# Patient Record
Sex: FEMALE | Race: ASIAN | NOT HISPANIC OR LATINO | URBAN - METROPOLITAN AREA
[De-identification: names, ages, dates, MRNs, and addresses within clinical notes are randomized per-mention and may not be internally consistent; named-entity substitution may affect disease eponyms.]

---

## 2019-02-03 ENCOUNTER — EMERGENCY (EMERGENCY)
Facility: HOSPITAL | Age: 79
LOS: 1 days | Discharge: HOME | End: 2019-02-03
Attending: EMERGENCY MEDICINE | Admitting: EMERGENCY MEDICINE
Payer: MEDICARE

## 2019-02-03 VITALS
TEMPERATURE: 99 F | RESPIRATION RATE: 16 BRPM | HEART RATE: 98 BPM | SYSTOLIC BLOOD PRESSURE: 169 MMHG | OXYGEN SATURATION: 100 % | DIASTOLIC BLOOD PRESSURE: 83 MMHG

## 2019-02-03 VITALS
RESPIRATION RATE: 16 BRPM | OXYGEN SATURATION: 100 % | SYSTOLIC BLOOD PRESSURE: 177 MMHG | DIASTOLIC BLOOD PRESSURE: 62 MMHG | HEART RATE: 95 BPM

## 2019-02-03 LAB
ALBUMIN SERPL ELPH-MCNC: 4.7 G/DL — SIGNIFICANT CHANGE UP (ref 3.3–5)
ALP SERPL-CCNC: 77 U/L — SIGNIFICANT CHANGE UP (ref 40–120)
ALT FLD-CCNC: 10 U/L — SIGNIFICANT CHANGE UP (ref 4–33)
ANION GAP SERPL CALC-SCNC: 16 MMO/L — HIGH (ref 7–14)
APPEARANCE UR: CLEAR — SIGNIFICANT CHANGE UP
AST SERPL-CCNC: 23 U/L — SIGNIFICANT CHANGE UP (ref 4–32)
BASE EXCESS BLDV CALC-SCNC: 0.5 MMOL/L — SIGNIFICANT CHANGE UP
BASOPHILS # BLD AUTO: 0.03 K/UL — SIGNIFICANT CHANGE UP (ref 0–0.2)
BASOPHILS NFR BLD AUTO: 0.4 % — SIGNIFICANT CHANGE UP (ref 0–2)
BILIRUB SERPL-MCNC: 0.3 MG/DL — SIGNIFICANT CHANGE UP (ref 0.2–1.2)
BILIRUB UR-MCNC: NEGATIVE — SIGNIFICANT CHANGE UP
BLD GP AB SCN SERPL QL: NEGATIVE — SIGNIFICANT CHANGE UP
BLOOD GAS VENOUS - CREATININE: 0.8 MG/DL — SIGNIFICANT CHANGE UP (ref 0.5–1.3)
BLOOD UR QL VISUAL: HIGH
BUN SERPL-MCNC: 16 MG/DL — SIGNIFICANT CHANGE UP (ref 7–23)
CALCIUM SERPL-MCNC: 9.9 MG/DL — SIGNIFICANT CHANGE UP (ref 8.4–10.5)
CHLORIDE BLDV-SCNC: 105 MMOL/L — SIGNIFICANT CHANGE UP (ref 96–108)
CHLORIDE SERPL-SCNC: 100 MMOL/L — SIGNIFICANT CHANGE UP (ref 98–107)
CO2 SERPL-SCNC: 22 MMOL/L — SIGNIFICANT CHANGE UP (ref 22–31)
COLOR SPEC: YELLOW — SIGNIFICANT CHANGE UP
CREAT SERPL-MCNC: 0.89 MG/DL — SIGNIFICANT CHANGE UP (ref 0.5–1.3)
EOSINOPHIL # BLD AUTO: 0.02 K/UL — SIGNIFICANT CHANGE UP (ref 0–0.5)
EOSINOPHIL NFR BLD AUTO: 0.3 % — SIGNIFICANT CHANGE UP (ref 0–6)
EPI CELLS # UR: SIGNIFICANT CHANGE UP
GAS PNL BLDV: 135 MMOL/L — LOW (ref 136–146)
GLUCOSE BLDV-MCNC: 131 — HIGH (ref 70–99)
GLUCOSE SERPL-MCNC: 142 MG/DL — HIGH (ref 70–99)
GLUCOSE UR-MCNC: NEGATIVE — SIGNIFICANT CHANGE UP
HCO3 BLDV-SCNC: 23 MMOL/L — SIGNIFICANT CHANGE UP (ref 20–27)
HCT VFR BLD CALC: 39.8 % — SIGNIFICANT CHANGE UP (ref 34.5–45)
HCT VFR BLDV CALC: 38.5 % — SIGNIFICANT CHANGE UP (ref 34.5–45)
HGB BLD-MCNC: 12.2 G/DL — SIGNIFICANT CHANGE UP (ref 11.5–15.5)
HGB BLDV-MCNC: 12.5 G/DL — SIGNIFICANT CHANGE UP (ref 11.5–15.5)
IMM GRANULOCYTES NFR BLD AUTO: 0.3 % — SIGNIFICANT CHANGE UP (ref 0–1.5)
KETONES UR-MCNC: NEGATIVE — SIGNIFICANT CHANGE UP
LACTATE BLDV-MCNC: 2.2 MMOL/L — HIGH (ref 0.5–2)
LEUKOCYTE ESTERASE UR-ACNC: SIGNIFICANT CHANGE UP
LYMPHOCYTES # BLD AUTO: 2 K/UL — SIGNIFICANT CHANGE UP (ref 1–3.3)
LYMPHOCYTES # BLD AUTO: 25.3 % — SIGNIFICANT CHANGE UP (ref 13–44)
MCHC RBC-ENTMCNC: 27.9 PG — SIGNIFICANT CHANGE UP (ref 27–34)
MCHC RBC-ENTMCNC: 30.7 % — LOW (ref 32–36)
MCV RBC AUTO: 90.9 FL — SIGNIFICANT CHANGE UP (ref 80–100)
MONOCYTES # BLD AUTO: 0.3 K/UL — SIGNIFICANT CHANGE UP (ref 0–0.9)
MONOCYTES NFR BLD AUTO: 3.8 % — SIGNIFICANT CHANGE UP (ref 2–14)
NEUTROPHILS # BLD AUTO: 5.53 K/UL — SIGNIFICANT CHANGE UP (ref 1.8–7.4)
NEUTROPHILS NFR BLD AUTO: 69.9 % — SIGNIFICANT CHANGE UP (ref 43–77)
NITRITE UR-MCNC: NEGATIVE — SIGNIFICANT CHANGE UP
NRBC # FLD: 0 K/UL — LOW (ref 25–125)
PCO2 BLDV: 54 MMHG — HIGH (ref 41–51)
PH BLDV: 7.31 PH — LOW (ref 7.32–7.43)
PH UR: 6.5 — SIGNIFICANT CHANGE UP (ref 5–8)
PLATELET # BLD AUTO: 384 K/UL — SIGNIFICANT CHANGE UP (ref 150–400)
PMV BLD: 9.9 FL — SIGNIFICANT CHANGE UP (ref 7–13)
PO2 BLDV: 28 MMHG — LOW (ref 35–40)
POTASSIUM BLDV-SCNC: 3.6 MMOL/L — SIGNIFICANT CHANGE UP (ref 3.4–4.5)
POTASSIUM SERPL-MCNC: 4.1 MMOL/L — SIGNIFICANT CHANGE UP (ref 3.5–5.3)
POTASSIUM SERPL-SCNC: 4.1 MMOL/L — SIGNIFICANT CHANGE UP (ref 3.5–5.3)
PROT SERPL-MCNC: 8.3 G/DL — SIGNIFICANT CHANGE UP (ref 6–8.3)
PROT UR-MCNC: 70 — SIGNIFICANT CHANGE UP
RBC # BLD: 4.38 M/UL — SIGNIFICANT CHANGE UP (ref 3.8–5.2)
RBC # FLD: 13.9 % — SIGNIFICANT CHANGE UP (ref 10.3–14.5)
RBC CASTS # UR COMP ASSIST: SIGNIFICANT CHANGE UP (ref 0–?)
RH IG SCN BLD-IMP: NEGATIVE — SIGNIFICANT CHANGE UP
SAO2 % BLDV: 40.8 % — LOW (ref 60–85)
SODIUM SERPL-SCNC: 138 MMOL/L — SIGNIFICANT CHANGE UP (ref 135–145)
SP GR SPEC: 1.01 — SIGNIFICANT CHANGE UP (ref 1–1.04)
UROBILINOGEN FLD QL: NORMAL — SIGNIFICANT CHANGE UP
WBC # BLD: 7.9 K/UL — SIGNIFICANT CHANGE UP (ref 3.8–10.5)
WBC # FLD AUTO: 7.9 K/UL — SIGNIFICANT CHANGE UP (ref 3.8–10.5)
WBC UR QL: SIGNIFICANT CHANGE UP (ref 0–?)

## 2019-02-03 PROCEDURE — 99284 EMERGENCY DEPT VISIT MOD MDM: CPT

## 2019-02-03 NOTE — ED PROVIDER NOTE - ATTENDING CONTRIBUTION TO CARE
Attending note:   After face to face evaluation of this patient, I concur with above noted hx, pe, and care plan for this patient.  79 y/o F with recent uterine bx b/o uterine thickening on ultrasound c/o chronic pelvic pain since pessary removed.   Abd benign.   Evaluation in progress

## 2019-02-03 NOTE — ED ADULT NURSE NOTE - OBJECTIVE STATEMENT
Pt rec'd to ED R#8 Aox4, in NAD, c/o intermittent lower abd pain "burning" x2 months. Was being worked up in [facility] but is unhappy with the level of care. Had uterine biopsy performed on 01/31 and has not yet heard the results. Pt denies abd pain at this time, also denies N/V/D/ dysuria/ CP/ SOB/ other acute complaints. Pt is ambulatory to room. Son at bedside. VSS.

## 2019-02-03 NOTE — ED ADULT TRIAGE NOTE - CHIEF COMPLAINT QUOTE
c/o lower abdominal pain x 2 months and now having vaginal bleeding since this A.M., patient is s/p biopsy of uterus 01/31/19 at [facility].

## 2019-02-03 NOTE — ED PROVIDER NOTE - OBJECTIVE STATEMENT
78 year old female with pmhx of DM presenting with vaginal spotting for 1 day. Patient states today when she was urinating she noticed blood when wiping. Patient had previous spotting in December, which she was using a pessary for. Pessary was taken out 6 weeks ago, and since then has had intermittent lower abdominal and vaginal pain. Came to ED because was not able to get into gynecologist or PMDs office for pain or bleeding. Has taken tylenol for pain which has been unrelieved.    Had US showing thickened uterus, which bx was taken which is pending by Gyn for results.    Denies fevers, CP SOB N/V

## 2019-02-03 NOTE — ED PROVIDER NOTE - PROGRESS NOTE DETAILS
[facility] Note: pt p/w dysuria persistent since december and vag bleeding (2 pads per day, no clots, mild), respiratory acidosis [facility] Note: pt p/w dysuria persistent since december and vag bleeding (2 pads per day, no clots, mild), has been worked up by obgyn for this and had us and biopsy (results pending)., no recent change in symptoms. Pt states symptoms have improved since arrival. UA neg. Labs significant only for mild resp acidosis. Pt denies respiratory problems, hx copd or asthma. Endorses nocturnal snoring. Encouraged to d/w pmd.

## 2019-02-03 NOTE — ED PROVIDER NOTE - NSFOLLOWUPINSTRUCTIONS_ED_ALL_ED_FT
Follow up with your gynecologist and primary doctor.  Discuss biopsy results with your gynecologist and potential for pessary replacement.  Discuss your respiratory acidosis with your primary doctor and potential for pulmonology followup.  Return if you develop shortness of breath, chest pain, worsened vaginal bleeding, or abdominal pain.

## 2019-02-03 NOTE — ED PROVIDER NOTE - MEDICAL DECISION MAKING DETAILS
78 year old female with DM presenting with 1 day of vaginal spotting. Likely 2/2 postmenopausal bleeding, which is being evaluated by gyn outpatient. Will get cbc cmp trops type/screen and urine studies to evaluate. If neg, can f/u with PMD and gyn.

## 2019-02-03 NOTE — ED PROVIDER NOTE - CARE PLAN
Principal Discharge DX:	Vaginal spotting Principal Discharge DX:	Vaginal spotting  Assessment and plan of treatment:	The patient was given verbal and written discharge instructions. Specifically, instructions when to return to the ED and when to seek follow-up from their pcp was discussed. Any specialty follow-up was discussed, including how to make an appointment.  Instructions were discussed in simple, plain language and was understood by the patient. The patient understands that should their symptoms worsen or any new symptoms arise, they should return to the ED immediately for further evaluation.

## 2019-02-03 NOTE — ED ADULT NURSE NOTE - CHIEF COMPLAINT QUOTE
c/o lower abdominal pain x 2 months and now having vaginal bleeding since this A.M., patient is s/p biopsy of uterus 01/31/19 at [facility].

## 2023-07-25 ENCOUNTER — INPATIENT (INPATIENT)
Facility: HOSPITAL | Age: 83
LOS: 2 days | Discharge: HOME | End: 2023-07-28
Attending: STUDENT IN AN ORGANIZED HEALTH CARE EDUCATION/TRAINING PROGRAM | Admitting: STUDENT IN AN ORGANIZED HEALTH CARE EDUCATION/TRAINING PROGRAM
Payer: MEDICARE

## 2023-07-25 VITALS
RESPIRATION RATE: 18 BRPM | DIASTOLIC BLOOD PRESSURE: 80 MMHG | TEMPERATURE: 99 F | HEART RATE: 95 BPM | OXYGEN SATURATION: 100 % | SYSTOLIC BLOOD PRESSURE: 153 MMHG

## 2023-07-25 LAB
ALBUMIN SERPL ELPH-MCNC: 3.9 G/DL — SIGNIFICANT CHANGE UP (ref 3.3–5)
ALP SERPL-CCNC: 123 U/L — HIGH (ref 40–120)
ALT FLD-CCNC: 28 U/L — SIGNIFICANT CHANGE UP (ref 4–33)
ANION GAP SERPL CALC-SCNC: 11 MMOL/L — SIGNIFICANT CHANGE UP (ref 7–14)
AST SERPL-CCNC: 31 U/L — SIGNIFICANT CHANGE UP (ref 4–32)
BASOPHILS # BLD AUTO: 0.04 K/UL — SIGNIFICANT CHANGE UP (ref 0–0.2)
BASOPHILS NFR BLD AUTO: 0.4 % — SIGNIFICANT CHANGE UP (ref 0–2)
BILIRUB SERPL-MCNC: <0.2 MG/DL — SIGNIFICANT CHANGE UP (ref 0.2–1.2)
BUN SERPL-MCNC: 10 MG/DL — SIGNIFICANT CHANGE UP (ref 7–23)
CALCIUM SERPL-MCNC: 9.4 MG/DL — SIGNIFICANT CHANGE UP (ref 8.4–10.5)
CHLORIDE SERPL-SCNC: 102 MMOL/L — SIGNIFICANT CHANGE UP (ref 98–107)
CO2 SERPL-SCNC: 21 MMOL/L — LOW (ref 22–31)
CREAT SERPL-MCNC: 0.88 MG/DL — SIGNIFICANT CHANGE UP (ref 0.5–1.3)
EGFR: 64 ML/MIN/1.73M2 — SIGNIFICANT CHANGE UP
EOSINOPHIL # BLD AUTO: 0.25 K/UL — SIGNIFICANT CHANGE UP (ref 0–0.5)
EOSINOPHIL NFR BLD AUTO: 2.7 % — SIGNIFICANT CHANGE UP (ref 0–6)
GLUCOSE SERPL-MCNC: 124 MG/DL — HIGH (ref 70–99)
HCT VFR BLD CALC: 35.3 % — SIGNIFICANT CHANGE UP (ref 34.5–45)
HGB BLD-MCNC: 11.1 G/DL — LOW (ref 11.5–15.5)
IANC: 5.92 K/UL — SIGNIFICANT CHANGE UP (ref 1.8–7.4)
IMM GRANULOCYTES NFR BLD AUTO: 0.3 % — SIGNIFICANT CHANGE UP (ref 0–0.9)
LYMPHOCYTES # BLD AUTO: 2.38 K/UL — SIGNIFICANT CHANGE UP (ref 1–3.3)
LYMPHOCYTES # BLD AUTO: 25.7 % — SIGNIFICANT CHANGE UP (ref 13–44)
MAGNESIUM SERPL-MCNC: 2.6 MG/DL — SIGNIFICANT CHANGE UP (ref 1.6–2.6)
MCHC RBC-ENTMCNC: 28.5 PG — SIGNIFICANT CHANGE UP (ref 27–34)
MCHC RBC-ENTMCNC: 31.4 GM/DL — LOW (ref 32–36)
MCV RBC AUTO: 90.7 FL — SIGNIFICANT CHANGE UP (ref 80–100)
MONOCYTES # BLD AUTO: 0.65 K/UL — SIGNIFICANT CHANGE UP (ref 0–0.9)
MONOCYTES NFR BLD AUTO: 7 % — SIGNIFICANT CHANGE UP (ref 2–14)
NEUTROPHILS # BLD AUTO: 5.92 K/UL — SIGNIFICANT CHANGE UP (ref 1.8–7.4)
NEUTROPHILS NFR BLD AUTO: 63.9 % — SIGNIFICANT CHANGE UP (ref 43–77)
NRBC # BLD: 0 /100 WBCS — SIGNIFICANT CHANGE UP (ref 0–0)
NRBC # FLD: 0 K/UL — SIGNIFICANT CHANGE UP (ref 0–0)
PHOSPHATE SERPL-MCNC: 3.6 MG/DL — SIGNIFICANT CHANGE UP (ref 2.5–4.5)
PLATELET # BLD AUTO: 403 K/UL — HIGH (ref 150–400)
POTASSIUM SERPL-MCNC: 5.4 MMOL/L — HIGH (ref 3.5–5.3)
POTASSIUM SERPL-SCNC: 5.4 MMOL/L — HIGH (ref 3.5–5.3)
PROT SERPL-MCNC: 6.7 G/DL — SIGNIFICANT CHANGE UP (ref 6–8.3)
RBC # BLD: 3.89 M/UL — SIGNIFICANT CHANGE UP (ref 3.8–5.2)
RBC # FLD: 13.8 % — SIGNIFICANT CHANGE UP (ref 10.3–14.5)
SODIUM SERPL-SCNC: 134 MMOL/L — LOW (ref 135–145)
WBC # BLD: 9.27 K/UL — SIGNIFICANT CHANGE UP (ref 3.8–10.5)
WBC # FLD AUTO: 9.27 K/UL — SIGNIFICANT CHANGE UP (ref 3.8–10.5)

## 2023-07-25 PROCEDURE — 99285 EMERGENCY DEPT VISIT HI MDM: CPT

## 2023-07-25 RX ORDER — SODIUM CHLORIDE 9 MG/ML
1000 INJECTION INTRAMUSCULAR; INTRAVENOUS; SUBCUTANEOUS ONCE
Refills: 0 | Status: COMPLETED | OUTPATIENT
Start: 2023-07-25 | End: 2023-07-25

## 2023-07-25 RX ORDER — IOHEXOL 300 MG/ML
30 INJECTION, SOLUTION INTRAVENOUS ONCE
Refills: 0 | Status: COMPLETED | OUTPATIENT
Start: 2023-07-25 | End: 2023-07-25

## 2023-07-25 RX ADMIN — IOHEXOL 30 MILLILITER(S): 300 INJECTION, SOLUTION INTRAVENOUS at 23:21

## 2023-07-25 RX ADMIN — SODIUM CHLORIDE 1000 MILLILITER(S): 9 INJECTION INTRAMUSCULAR; INTRAVENOUS; SUBCUTANEOUS at 22:46

## 2023-07-25 NOTE — ED PROVIDER NOTE - CLINICAL SUMMARY MEDICAL DECISION MAKING FREE TEXT BOX
83-year-old female with a past medical history of hypertension, DM, hyperlipidemia presenting with concern of abdominal pain and constipation. Differential diagnosis includes but is not limited to obstruction, infection, mass, constipation, electrolyte abnormality. would get lab, CTAP, and if no obstruction would give and enema. fluids as pt appears dehydrated. dispo pending imaging results.

## 2023-07-25 NOTE — ED ADULT TRIAGE NOTE - CHIEF COMPLAINT QUOTE
abd pain and constipation    pt c/o abd pain and constipation for 3 days.. passed flatus yesterday.  pt had prolapsed bladder last week and possible urinary retention.. had barajas cathter inserted.  past medical history- diabetes type 2, htn, high cholesterol

## 2023-07-25 NOTE — ED PROVIDER NOTE - OBJECTIVE STATEMENT
83-year-old female with a past medical history of hypertension, DM, hyperlipidemia presenting with concern of abdominal pain and constipation.  Patient has had this for the last 2 to 3 weeks.  History of a midline  section.  She was initially seen at [facility] they gave her some medications without relief.  CT performed, not commenting on stool burden.  Patient had to have a Leon placed for retention due to some sort of urethral abnormality, that they are unclear whether it was a mass or not.  Patient tried OTC medications including laxatives without relief.

## 2023-07-25 NOTE — ED ADULT NURSE NOTE - OBJECTIVE STATEMENT
patient alert and oriented x4 ambulatory, c/o abdominal pain and constipation for 3 days- was seen at another hospital with a negative workup. patient was diagnosed with prolapsed bladder last week and possible urinary retention- arrives with barajas catheter in place to leg bag drainage. patient is well appearing, no acute distress noted. 20G IV placed in L AC, labs sent.

## 2023-07-25 NOTE — ED PROVIDER NOTE - CARE PLAN
1 Principal Discharge DX:	Rectal prolapse  Secondary Diagnosis:	Abdominal pain   Principal Discharge DX:	Abdominal pain  Secondary Diagnosis:	Rectal prolapse

## 2023-07-25 NOTE — ED ADULT NURSE NOTE - NSFALLUNIVINTERV_ED_ALL_ED
Bed/Stretcher in lowest position, wheels locked, appropriate side rails in place/Call bell, personal items and telephone in reach/Instruct patient to call for assistance before getting out of bed/chair/stretcher/Non-slip footwear applied when patient is off stretcher/[place] to call system/Physically safe environment - no spills, clutter or unnecessary equipment/Purposeful proactive rounding/Room/bathroom lighting operational, light cord in reach

## 2023-07-25 NOTE — ED PROVIDER NOTE - ATTENDING CONTRIBUTION TO CARE
83-year-old female past medical history of asthma, hypertension, hyperlipidemia, hypothyroidism, recently diagnosed with tracheomalacia, brought in by daughter with complaints of generalized weakness for the past few days, with increasing shortness of breath and wheezing.  Patient's daughter states that she typically has noisy breathing secondary tracheomalacia, but noted to have increased wheezing for the past few days.  Today patient complained of increased shortness of breath, noted to be hypoxic into the 80s.  Denies chest pain, leg pain or swelling from baseline. Patient's daughter gave her nebulizer which improved her oxygenation to 92%.  Placed on oxygen by EMS and O2 improved to 100%. Patient arrived to the ED in respiratory distress, with audible wheezing across the room.  BiPAP ordered, labs, chest x-ray, to be admitted. 83-year-old female past medical history of hypertension, diabetes, hyperlipidemia, , presented with complaints of constipation for 3 weeks.  Patient previously seen by urgent care and started on stool softeners, then presented to an outside emergency room where she had a CAT scan done and was also given an enema.  Symptoms have not improved.  Patient complains abdominal distention, mild and lower abdominal pain.  Denies fevers or chills.  Denies black or bloody stools.  On exam, patient is well-appearing, no acute distress, heart regular rate and rhythm, lungs clear to auscultation bilaterally, abdomen soft, nondistended, lower abdominal tenderness to palpation.  Plan for CT abdomen pelvis with IV contrast rule out SBO

## 2023-07-25 NOTE — ED PROVIDER NOTE - NSFOLLOWUPINSTRUCTIONS_ED_ALL_ED_FT
Rectal Prolapse    WHAT YOU NEED TO KNOW:    A rectal prolapse is a condition that causes your rectum to come through your anus. The rectum is the end of your bowel. A prolapse may happen during a bowel movement. A prolapse may happen more often in women after childbirth or who are older than 50 years.    DISCHARGE INSTRUCTIONS:    Call your local emergency number (911 in the ) for any of the following:    You have trouble breathing.    Your heart is beating faster than usual.  Seek care immediately if:    You have severe pain in your abdomen.    Your abdomen looks bigger than usual.    Blood from your rectum soaks through your underwear.  Call your doctor if:    You have a fever.    You have nausea or are vomiting.    You see larger amounts of blood in your bowel movement than before.    You have questions or concerns about your condition or care.  Medicines:    Stool softeners help prevent constipation.    Laxatives help your intestines relax and loosen to prevent constipation.    Take your medicine as directed. Contact your healthcare provider if you think your medicine is not helping or if you have side effects. Tell your provider if you are allergic to any medicine. Keep a list of the medicines, vitamins, and herbs you take. Include the amounts, and when and why you take them. Bring the list or the pill bottles to follow-up visits. Carry your medicine list with you in case of an emergency.  How to do a manual reduction: Manual reduction is a procedure you can do to place your rectum back inside of your anus. Your healthcare provider will show you how to do a manual reduction. You may need a family member to help you with manual reduction. The following are general steps to follow. Your healthcare provider may give you specific steps to follow.    Your healthcare provider may tell you to apply sugar to your rectum before manual reduction. This may help decrease the swelling of your rectum, and make it easier to put back inside your anus. Ask your provider about how to apply sugar to your rectum.    Lie on your back with your knees bent.    Wash your hands and put on gloves. Lubricate your glove with petroleum jelly.    Hold your rectum on both sides of the anus. Gently apply firm, steady pressure on your rectum and push it into your anus. You may need to apply pressure for several minutes if the bowel is swollen. Inspect your anus. You can use a mirror or have your family member inspect your anus. You should not see the rectum. If a prolapse happens again, you can repeat manual reduction.    You can hold the rectum in place with gauze and tape across your buttocks. Before you apply gauze, place a quarter size amount of petroleum jelly on the gauze. The petroleum jelly will prevent the gauze from sticking to your rectum. Remove the gauze as directed by your healthcare provider.  Prevent a rectal prolapse:    Eat more high-fiber foods. This may help decrease constipation by adding bulk and softness to your bowel movements. Your healthcare provider can help you create a meal plan that includes high-fiber foods. High fiber foods include fruit, vegetables, whole-grain breads, and cooked beans.        Increase the amount of liquid you drink. Liquids can help keep your bowel movements soft and prevent constipation. Ask your healthcare provider how much liquid you should drink each day.    Exercise your pelvic muscles. Kegel exercises strengthen the pelvic muscles. These exercises involve tightening and relaxing vaginal and rectal muscles. Kegel exercises can make the rectal muscles stronger and improve bowel control. Ask your healthcare provider for more information on how to do Kegel exercises.    Do not sit for long amounts of time. You may put too much pressure on your anus. Pressure on your anus may cause a rectal prolapse.  Follow up with your doctor as directed: Write down your questions so you remember to ask them during your visits.

## 2023-07-25 NOTE — ED PROVIDER NOTE - PHYSICAL EXAMINATION
General: well -appearing, no acute distress  Head: Atraumatic, normocephalic  Eyes: EOM grossly in tact, no scleral icterus, no discharge  ENT: dry mucous membranes  Neurology: A&Ox 3, nonfocal, BRUSH x 4  Respiratory:  normal respiratory effort  CV:  Extremities warm and well perfused  Abdominal: Soft, non-distended, non-tender, no masses  Extremities: No edema, no deformities  Skin: warm and dry. No rashes

## 2023-07-25 NOTE — ED PROVIDER NOTE - PROGRESS NOTE DETAILS
[place] PGY 3  After lengthy discussion with the family patient was stating that she is been having difficulty with eating.  Family uncomfortable taking her home given that she is having her second appearance in the ED for similar complaints.  Patient offered admission and accepted for possible GI follow-up in conjunction with patient and but has tolerated p.o. in the ED/unwilling to.

## 2023-07-26 DIAGNOSIS — Z29.9 ENCOUNTER FOR PROPHYLACTIC MEASURES, UNSPECIFIED: ICD-10-CM

## 2023-07-26 DIAGNOSIS — N81.4 UTEROVAGINAL PROLAPSE, UNSPECIFIED: ICD-10-CM

## 2023-07-26 DIAGNOSIS — K62.3 RECTAL PROLAPSE: ICD-10-CM

## 2023-07-26 DIAGNOSIS — I10 ESSENTIAL (PRIMARY) HYPERTENSION: ICD-10-CM

## 2023-07-26 DIAGNOSIS — R10.9 UNSPECIFIED ABDOMINAL PAIN: ICD-10-CM

## 2023-07-26 DIAGNOSIS — E11.9 TYPE 2 DIABETES MELLITUS WITHOUT COMPLICATIONS: ICD-10-CM

## 2023-07-26 DIAGNOSIS — N13.30 UNSPECIFIED HYDRONEPHROSIS: ICD-10-CM

## 2023-07-26 PROCEDURE — 99222 1ST HOSP IP/OBS MODERATE 55: CPT | Mod: GC

## 2023-07-26 PROCEDURE — 74177 CT ABD & PELVIS W/CONTRAST: CPT | Mod: 26,MA

## 2023-07-26 PROCEDURE — 99223 1ST HOSP IP/OBS HIGH 75: CPT

## 2023-07-26 RX ORDER — DORZOLAMIDE HYDROCHLORIDE TIMOLOL MALEATE 20; 5 MG/ML; MG/ML
0 SOLUTION/ DROPS OPHTHALMIC
Qty: 0 | Refills: 0 | DISCHARGE

## 2023-07-26 RX ORDER — POLYETHYLENE GLYCOL 3350 17 G/17G
17 POWDER, FOR SOLUTION ORAL ONCE
Refills: 0 | Status: COMPLETED | OUTPATIENT
Start: 2023-07-26 | End: 2023-07-26

## 2023-07-26 RX ORDER — LANOLIN ALCOHOL/MO/W.PET/CERES
3 CREAM (GRAM) TOPICAL AT BEDTIME
Refills: 0 | Status: DISCONTINUED | OUTPATIENT
Start: 2023-07-26 | End: 2023-07-28

## 2023-07-26 RX ORDER — PSYLLIUM SEED (WITH DEXTROSE)
1 POWDER (GRAM) ORAL DAILY
Refills: 0 | Status: DISCONTINUED | OUTPATIENT
Start: 2023-07-26 | End: 2023-07-28

## 2023-07-26 RX ORDER — ATORVASTATIN CALCIUM 80 MG/1
20 TABLET, FILM COATED ORAL AT BEDTIME
Refills: 0 | Status: DISCONTINUED | OUTPATIENT
Start: 2023-07-26 | End: 2023-07-28

## 2023-07-26 RX ORDER — LISINOPRIL 2.5 MG/1
0 TABLET ORAL
Qty: 0 | Refills: 0 | DISCHARGE

## 2023-07-26 RX ORDER — GABAPENTIN 400 MG/1
0 CAPSULE ORAL
Qty: 0 | Refills: 0 | DISCHARGE

## 2023-07-26 RX ORDER — ACETAMINOPHEN 500 MG
650 TABLET ORAL EVERY 6 HOURS
Refills: 0 | Status: DISCONTINUED | OUTPATIENT
Start: 2023-07-26 | End: 2023-07-28

## 2023-07-26 RX ORDER — ONDANSETRON 8 MG/1
4 TABLET, FILM COATED ORAL EVERY 8 HOURS
Refills: 0 | Status: DISCONTINUED | OUTPATIENT
Start: 2023-07-26 | End: 2023-07-28

## 2023-07-26 RX ORDER — EZETIMIBE 10 MG/1
0 TABLET ORAL
Qty: 0 | Refills: 0 | DISCHARGE

## 2023-07-26 RX ORDER — METFORMIN HYDROCHLORIDE 850 MG/1
0 TABLET ORAL
Qty: 0 | Refills: 0 | DISCHARGE

## 2023-07-26 RX ORDER — LISINOPRIL 2.5 MG/1
20 TABLET ORAL DAILY
Refills: 0 | Status: DISCONTINUED | OUTPATIENT
Start: 2023-07-26 | End: 2023-07-26

## 2023-07-26 RX ORDER — LISINOPRIL 2.5 MG/1
20 TABLET ORAL DAILY
Refills: 0 | Status: DISCONTINUED | OUTPATIENT
Start: 2023-07-26 | End: 2023-07-28

## 2023-07-26 RX ORDER — GABAPENTIN 400 MG/1
100 CAPSULE ORAL AT BEDTIME
Refills: 0 | Status: DISCONTINUED | OUTPATIENT
Start: 2023-07-26 | End: 2023-07-28

## 2023-07-26 RX ORDER — ACETAMINOPHEN 500 MG
1000 TABLET ORAL ONCE
Refills: 0 | Status: COMPLETED | OUTPATIENT
Start: 2023-07-26 | End: 2023-07-26

## 2023-07-26 RX ORDER — DAPAGLIFLOZIN 10 MG/1
0 TABLET, FILM COATED ORAL
Qty: 0 | Refills: 0 | DISCHARGE

## 2023-07-26 RX ORDER — ACETAMINOPHEN 500 MG
1000 TABLET ORAL ONCE
Refills: 0 | Status: COMPLETED | OUTPATIENT
Start: 2023-07-26 | End: 2023-07-27

## 2023-07-26 RX ORDER — DORZOLAMIDE HYDROCHLORIDE TIMOLOL MALEATE 20; 5 MG/ML; MG/ML
1 SOLUTION/ DROPS OPHTHALMIC
Refills: 0 | Status: DISCONTINUED | OUTPATIENT
Start: 2023-07-26 | End: 2023-07-28

## 2023-07-26 RX ORDER — METOPROLOL TARTRATE 50 MG
100 TABLET ORAL DAILY
Refills: 0 | Status: DISCONTINUED | OUTPATIENT
Start: 2023-07-26 | End: 2023-07-28

## 2023-07-26 RX ORDER — AMLODIPINE BESYLATE 2.5 MG/1
5 TABLET ORAL DAILY
Refills: 0 | Status: DISCONTINUED | OUTPATIENT
Start: 2023-07-26 | End: 2023-07-28

## 2023-07-26 RX ORDER — POLYETHYLENE GLYCOL 3350 17 G/17G
17 POWDER, FOR SOLUTION ORAL DAILY
Refills: 0 | Status: DISCONTINUED | OUTPATIENT
Start: 2023-07-26 | End: 2023-07-27

## 2023-07-26 RX ADMIN — Medication 1000 MILLIGRAM(S): at 07:30

## 2023-07-26 RX ADMIN — ATORVASTATIN CALCIUM 20 MILLIGRAM(S): 80 TABLET, FILM COATED ORAL at 21:59

## 2023-07-26 RX ADMIN — GABAPENTIN 100 MILLIGRAM(S): 400 CAPSULE ORAL at 21:59

## 2023-07-26 RX ADMIN — DORZOLAMIDE HYDROCHLORIDE TIMOLOL MALEATE 1 DROP(S): 20; 5 SOLUTION/ DROPS OPHTHALMIC at 21:59

## 2023-07-26 RX ADMIN — Medication 400 MILLIGRAM(S): at 06:41

## 2023-07-26 NOTE — H&P ADULT - PROBLEM SELECTOR PLAN 1
Reducible. no evidence of bowel obstruction on imaging  discussed with surgery, hold off on consult for now, can f/u outpatient for definitive management  will consult GI to see if colonoscopy is warranted  in reviewing CT scan, no significant stool burden noted, no obstruction noted Reducible. no evidence of bowel obstruction on imaging  discussed with surgery, hold off on consult for now, can f/u outpatient for definitive management  will consult GI to see if colonoscopy is warranted  in reviewing CT scan, no significant stool burden noted, no obstruction noted  will start daily miralax to avoid straining in patient  CLD till GI sees patient

## 2023-07-26 NOTE — CONSULT NOTE ADULT - SUBJECTIVE AND OBJECTIVE BOX
[facility] GI CONSULTATION    Patient is a 83y old  Female who presents with a chief complaint of Rectal prolapse (26 Jul 2023 10:15)    HPI:  HPI: 84 y/o F with hx DM2, HTN and HLD who presents with subacute abdominal pain x 4-6 weeks with worsening over the past 2 weeks. Patient reports about 5 weeks of constipation and inability to have full BM. She feels as if stool is getting stuck and unable to come out of rectum. She also reports about 2 months of "fallen bladder". Denies dysuria but reports lower pelvic pain. Patient denies n/v, abd pain, fever, chills.       PAST MEDICAL & SURGICAL HISTORY:  DM (diabetes mellitus)      HTN (hypertension)      No significant past surgical history      MEDS:  MEDICATIONS  (STANDING):  amLODIPine   Tablet 5 milliGRAM(s) Oral daily  atorvastatin 20 milliGRAM(s) Oral at bedtime  dorzolamide 2%/timolol 0.5% Ophthalmic Solution 1 Drop(s) Both EYES two times a day  gabapentin 100 milliGRAM(s) Oral at bedtime  lisinopril 20 milliGRAM(s) Oral daily  metoprolol succinate  milliGRAM(s) Oral daily  polyethylene glycol 3350 17 Gram(s) Oral daily    MEDICATIONS  (PRN):  acetaminophen     Tablet .. 650 milliGRAM(s) Oral every 6 hours PRN Temp greater or equal to 38C (100.4F), Mild Pain (1 - 3)  aluminum hydroxide/magnesium hydroxide/simethicone Suspension 30 milliLiter(s) Oral every 4 hours PRN Dyspepsia  melatonin 3 milliGRAM(s) Oral at bedtime PRN Insomnia  ondansetron Injectable 4 milliGRAM(s) IV Push every 8 hours PRN Nausea and/or Vomiting    Allergies    No Known Allergies    Intolerances      CONSTITUTIONAL:  No weight loss, fever, chills, weakness or fatigue.  HEENT:  Eyes:  No visual loss, blurred vision, double vision or yellow sclerae. Ears, Nose, Throat:  No hearing loss, sneezing, congestion, runny nose or sore throat.  SKIN:  No rash or itching.  CARDIOVASCULAR:  No chest pain, chest pressure or chest discomfort. No palpitations or edema.  RESPIRATORY:  No shortness of breath, cough or sputum.  GASTROINTESTINAL:  + rectal pain and constipation   GENITOURINARY:  + bladder prolapse, no dysuria   NEUROLOGICAL:  No headache, dizziness, syncope, paralysis, ataxia, numbness or tingling in the extremities. No change in bowel or bladder control.  MUSCULOSKELETAL:  No muscle, back pain, joint pain or stiffness.  HEMATOLOGIC:  No anemia, bleeding or bruising.  LYMPHATICS:  No enlarged nodes. No history of splenectomy.  PSYCHIATRIC:  No history of depression or anxiety.  ENDOCRINOLOGIC:  No reports of sweating, cold or heat intolerance. No polyuria or polydipsia.  ______________________________________________________________________  PHYSICAL EXAM:  T(C): 36.7 (07-26-23 @ 06:35), Max: 37.2 (07-25-23 @ 21:08)  HR: 96 (07-26-23 @ 06:35)  BP: 166/81 (07-26-23 @ 06:35)  RR: 18 (07-26-23 @ 06:35)  SpO2: 97% (07-26-23 @ 06:35)  Wt(kg): --      GEN: NAD, normocephalic  CVS: S1S2+  CHEST: clear to auscultation  : prolapsed uterus and bladder   ABD: soft , nontender, nondistended, bowel sounds present  EXTR: no cyanosis, no clubbing, no edema  NEURO: Awake and alert; oriented  SKIN:  warm;  non icteric  RECTAL: decreased rectal tone with small rectal prolapse    ______________________________________________________________________  LABS:                        11.1   9.27  )-----------( 403      ( 25 Jul 2023 22:42 )             35.3     07-25    134<L>  |  102  |  10  ----------------------------<  124<H>  5.4<H>   |  21<L>  |  0.88    Ca    9.4      25 Jul 2023 22:42  Phos  3.6     07-25  Mg     2.60     07-25    TPro  6.7  /  Alb  3.9  /  TBili  <0.2  /  DBili  x   /  AST  31  /  ALT  28  /  AlkPhos  123<H>  07-25    LIVER FUNCTIONS - ( 25 Jul 2023 22:42 )  Alb: 3.9 g/dL / Pro: 6.7 g/dL / ALK PHOS: 123 U/L / ALT: 28 U/L / AST: 31 U/L / GGT: x                INITIAL GI CONSULTATION    Patient is a 83y old  Female who presents with a chief complaint of Rectal prolapse (26 Jul 2023 10:15)    HPI:  HPI: 84 y/o F with hx DM2, HTN and HLD who presents with subacute abdominal pain x 4-6 weeks with worsening over the past 2 weeks. Patient reports about 5 weeks of constipation and inability to have full BM. She feels as if stool is getting stuck and unable to come out of rectum. She also reports about 2 months of "fallen bladder". Denies dysuria but reports lower pelvic pain. Patient denies n/v, abd pain, fever, chills.       PAST MEDICAL & SURGICAL HISTORY:  DM (diabetes mellitus)      HTN (hypertension)      No significant past surgical history      MEDS:  MEDICATIONS  (STANDING):  amLODIPine   Tablet 5 milliGRAM(s) Oral daily  atorvastatin 20 milliGRAM(s) Oral at bedtime  dorzolamide 2%/timolol 0.5% Ophthalmic Solution 1 Drop(s) Both EYES two times a day  gabapentin 100 milliGRAM(s) Oral at bedtime  lisinopril 20 milliGRAM(s) Oral daily  metoprolol succinate  milliGRAM(s) Oral daily  polyethylene glycol 3350 17 Gram(s) Oral daily    MEDICATIONS  (PRN):  acetaminophen     Tablet .. 650 milliGRAM(s) Oral every 6 hours PRN Temp greater or equal to 38C (100.4F), Mild Pain (1 - 3)  aluminum hydroxide/magnesium hydroxide/simethicone Suspension 30 milliLiter(s) Oral every 4 hours PRN Dyspepsia  melatonin 3 milliGRAM(s) Oral at bedtime PRN Insomnia  ondansetron Injectable 4 milliGRAM(s) IV Push every 8 hours PRN Nausea and/or Vomiting    Allergies    No Known Allergies    Intolerances      CONSTITUTIONAL:  No weight loss, fever, chills, weakness or fatigue.  HEENT:  Eyes:  No visual loss, blurred vision, double vision or yellow sclerae. Ears, Nose, Throat:  No hearing loss, sneezing, congestion, runny nose or sore throat.  SKIN:  No rash or itching.  CARDIOVASCULAR:  No chest pain, chest pressure or chest discomfort. No palpitations or edema.  RESPIRATORY:  No shortness of breath, cough or sputum.  GASTROINTESTINAL:  + rectal pain and constipation   GENITOURINARY:  + bladder prolapse, no dysuria   NEUROLOGICAL:  No headache, dizziness, syncope, paralysis, ataxia, numbness or tingling in the extremities. No change in bowel or bladder control.  MUSCULOSKELETAL:  No muscle, back pain, joint pain or stiffness.  HEMATOLOGIC:  No anemia, bleeding or bruising.  LYMPHATICS:  No enlarged nodes. No history of splenectomy.  PSYCHIATRIC:  No history of depression or anxiety.  ENDOCRINOLOGIC:  No reports of sweating, cold or heat intolerance. No polyuria or polydipsia.  ______________________________________________________________________  PHYSICAL EXAM:  T(C): 36.7 (07-26-23 @ 06:35), Max: 37.2 (07-25-23 @ 21:08)  HR: 96 (07-26-23 @ 06:35)  BP: 166/81 (07-26-23 @ 06:35)  RR: 18 (07-26-23 @ 06:35)  SpO2: 97% (07-26-23 @ 06:35)  Wt(kg): --  < from: CT Abdomen and Pelvis w/ Oral Cont and w/ IV Cont (07.26.23 @ 02:25) >  IMPRESSION:  Noevidence of bowel obstruction.    Moderate bilateral hydroureteronephrosis to the level of the bladder.   Bladder collapsed around a Leon catheter. Bladder, uterine, and rectal   prolapse through the pelvic floor.    < end of copied text >      GEN: NAD, normocephalic  CVS: S1S2+  CHEST: clear to auscultation  : prolapsed uterus and bladder   ABD: soft , nontender, nondistended, bowel sounds present  EXTR: no cyanosis, no clubbing, no edema  NEURO: Awake and alert; oriented  SKIN:  warm;  non icteric  RECTAL: decreased rectal tone with small rectal prolapse (reducible), inadequate squeeze  ______________________________________________________________________  LABS:                        11.1   9.27  )-----------( 403      ( 25 Jul 2023 22:42 )             35.3     07-25    134<L>  |  102  |  10  ----------------------------<  124<H>  5.4<H>   |  21<L>  |  0.88    Ca    9.4      25 Jul 2023 22:42  Phos  3.6     07-25  Mg     2.60     07-25    TPro  6.7  /  Alb  3.9  /  TBili  <0.2  /  DBili  x   /  AST  31  /  ALT  28  /  AlkPhos  123<H>  07-25    LIVER FUNCTIONS - ( 25 Jul 2023 22:42 )  Alb: 3.9 g/dL / Pro: 6.7 g/dL / ALK PHOS: 123 U/L / ALT: 28 U/L / AST: 31 U/L / GGT: x

## 2023-07-26 NOTE — H&P ADULT - ASSESSMENT
82 y/o F with hx DM2, HTN and HLD who presents with subacute abdominal pain x 4-6 weeks with worsening over the past 2 weeks found to have b/l hydronephrosis, as well as bladder, uterine and rectal prolapse. Suspect rectal prolapse is likely the main pathology playing a role in Pt's current symptoms

## 2023-07-26 NOTE — H&P ADULT - HISTORY OF PRESENT ILLNESS
HPOI HPI: HPI: 82 y/o F with hx DM2, HTN and HLD who presents with subacute abdominal pain x 4-6 weeks with worsening over the past 2 weeks. Per family Pt has been uncomfortable for some time. Has presented to care multiple times, told it was constipation, discharged on laxatives with no relief. Most recently Pt went to [facility], was assessed there (unclear if imaging was done), but was told about bladder prolapse, had a barajas inserted, given laxatives and told to f/u outpatient with urology. Pt reports feeling slightly improved after barajas but not significantly, continuing to feel urgency to use the restroom but not having a bowel movement. Per family, has not had solid stools in a month, just watery stools or small bill. +passing gas. +mild weight loss, does not know how much but has not been eating. No n/v.     Of note, Pt has a urology appt on Friday to discuss surgery.    In the ED, Pt had labs and a CT abd/pelv which noted moderate bilateral hydroureteronephrosis to the level of the bladder.   Bladder collapsed around a Barajas catheter. Bladder, uterine, and rectal prolapse through the pelvic floor.

## 2023-07-26 NOTE — CONSULT NOTE ADULT - ASSESSMENT
82 y/o F with hx DM2, HTN and HLD admitted for prolapse of organs; GI consulted for constipation and rectal prolapse.     #Constipation: Patient with pelvic floor dysfunction- bladder prolapse >2m, poor rectal tone, CT imaging without concern for large stool burden- DDX- very likely 2/2 pelvic floor dysfunction; unlikely 2/2 motility issue or intraabdominal or intracolonic obstruction.   #Rectal prolapse: reduced    Recommendations:  - Patient will require surgical intervention for multiple prolapses   - constipation likely 2/2 pelvic floor dysfunction- will need to be addressed by surgery either inpatient or outpatient   - can start patient on Miralax 2-3 times a day to help with BM's   - no indication for colonoscopy at this time given highly likely nature of constipation 2/2 prolapses     Sandhya Kwon MD  Gastroenterology/Hepatology Fellow, PGY-4  Please contact via TEAMS    NON-URGENT CONSULTS:  Please email [email or web address]  [email or web address]  AT NIGHT AND ON WEEKENDS:  Contact on-call GI fellow via answering service ([phone]) from 5pm-8am and on weekends/holidays  MONDAY-FRIDAY 8AM-5PM:  Pager# [phone]   82 y/o F with hx DM2, HTN and HLD admitted for prolapse of organs; GI consulted for constipation and rectal prolapse.     #Constipation: Patient with pelvic floor dysfunction- bladder prolapse >2m, poor rectal tone, CT imaging without concern for large stool burden- DDX- very likely 2/2 pelvic floor dysfunction; unlikely 2/2 motility issue or intraabdominal or intracolonic obstruction.   #Rectal prolapse: reduced    Recommendations:  - Patient will require surgical intervention for multiple prolapses   - fiber supplementation  - constipation likely 2/2 pelvic floor dysfunction- will need to be addressed by surgery either inpatient or outpatient   - can start patient on Miralax 2-3 times a day to help with BM's   - no indication for colonoscopy at this time given highly likely nature of constipation 2/2 prolapses     Sandhya Kwon MD  Gastroenterology/Hepatology Fellow, PGY-4  Please contact via TEAMS    NON-URGENT CONSULTS:  Please email [email or web address]  AT NIGHT AND ON WEEKENDS:  Contact on-call GI fellow via answering service ([phone]) from 5pm-8am and on weekends/holidays  MONDAY-FRIDAY 8AM-5PM:  Pager# [phone]   84 y/o F with hx DM2, HTN and HLD admitted for prolapse of organs; GI consulted for constipation and rectal prolapse.     #Constipation: Patient with pelvic floor dysfunction- bladder prolapse >2m, poor rectal tone, CT imaging without concern for large stool burden- DDX- very likely 2/2 pelvic floor dysfunction; unlikely 2/2 motility issue or intraabdominal or intracolonic obstruction.   #Rectal prolapse: reduced    Recommendations:  - Patient will require surgical intervention for multiple prolapses   - fiber supplementation  - constipation likely 2/2 pelvic floor dysfunction- will need to be addressed by surgery either inpatient or outpatient   - can start patient on Miralax 2-3 times a day to help with BM's   - no indication for colonoscopy at this time given highly likely nature of constipation 2/2 prolapses    thank you for involving us in the care of this patient. We will sign off, please call back with any further questions      Sandhya Kwon MD  Gastroenterology/Hepatology Fellow, PGY-4  Please contact via TEAMS    NON-URGENT CONSULTS:  Please email [email or web address]  [email or web address]  AT NIGHT AND ON WEEKENDS:  Contact on-call GI fellow via answering service ([phone]) from 5pm-8am and on weekends/holidays  MONDAY-FRIDAY 8AM-5PM:  Pager# [phone]

## 2023-07-26 NOTE — H&P ADULT - PROBLEM SELECTOR PLAN 2
barajas in place  has urology appt on Friday  renal function is stable barajas in place, draining appropriately with good urine output  has urology appt on Friday  renal function is stable

## 2023-07-26 NOTE — CHART NOTE - NSCHARTNOTEFT_GEN_A_CORE
Notified by RN that patient complains of pain in rectum. Patient seen and examined at bedside. Patient with bladder prolapse, no rectal prolapse noted. Cystocele was reduced. Likely 2/2 constipation as patients daughter said patient having small hard stools. Will add bowel regimen, advised patient not to strain. Will consider urology consult in am. Will monitor patient closely.

## 2023-07-26 NOTE — PATIENT PROFILE ADULT - ARRIVAL FROM
Home Airway patent. Nasal mucosa clear. Mouth with normal mucosa. Throat has no vesicles, no oropharyngeal exudates and uvula is midline.

## 2023-07-26 NOTE — H&P ADULT - NSHPPHYSICALEXAM_GEN_ALL_CORE
GEN: Appears in no acute distress  HEENT:tracking, neck supple,  no JVD  MOUTH: moist mucous membranes, no exudates or lesions   PULM: Clear to auscultation bilaterally, no wheezes, rales, rhonchi  CV: RRR, S1S2, no murmurs, rubs or gallops  Abdominal: Soft, mild tenderness to palpation of b/l lower quadrants, non-distended, normoactive bowel sounds  Extremities: WWP, No edema, + peripheral pulses  : barajas in place, rectal prolapse noted about 3-4 inches, easily reducible, Pt with mild improvement afterwards  NEURO: AAOx3, moving all four extremities spontaneously  Skin: No rashes, lesions, hematomas, ecchymosis

## 2023-07-26 NOTE — PATIENT PROFILE ADULT - FALL HARM RISK - HARM RISK INTERVENTIONS

## 2023-07-26 NOTE — ED ADULT NURSE REASSESSMENT NOTE - NS ED NURSE REASSESS COMMENT FT1
pt a/o x 3 resting comfortably in bed. vital signs stable. No complaints of chest pain, headache, nausea, dizziness, vomiting  SOB, fever, chills verbalized.

## 2023-07-26 NOTE — H&P ADULT - NSHPLABSRESULTS_GEN_ALL_CORE
11.1   9.27  )-----------( 403      ( 25 Jul 2023 22:42 )             35.3   07-25    134<L>  |  102  |  10  ----------------------------<  124<H>  5.4<H>   |  21<L>  |  0.88    Ca    9.4      25 Jul 2023 22:42  Phos  3.6     07-25  Mg     2.60     07-25    TPro  6.7  /  Alb  3.9  /  TBili  <0.2  /  DBili  x   /  AST  31  /  ALT  28  /  AlkPhos  123<H>  07-25    CT Abd pelv: IMPRESSION:  No evidence of bowel obstruction.    Moderate bilateral hydroureteronephrosis to the level of the bladder.   Bladder collapsed around a Leon catheter. Bladder, uterine, and rectal   prolapse through the pelvic floor.

## 2023-07-26 NOTE — CONSULT NOTE ADULT - ATTENDING COMMENTS
# Constipation: Patient reports 3+ weeks of difficulty defecating. Reports sensation of stool in rectum, but unable to evacuate stool easily. Prior to this, reports regular bowel movements. Notes history of bladder prolapse and occasional "drops" of urinary incontinence. Exam and imaging c/w bladder, uterine and rectal prolapse. Suspect constipation likely in setting of pelvic floor dysfunction and possible concomitant slow transit.     --Fiber supplementation  --Can trial bowel regimen with Miralax as noted above  --Further definitive management of prolapse per surgical team(s)  --No role for colonoscopy at this time; can be considered on outpatient basis if requested pre-op    Additional recommendations as above.

## 2023-07-27 LAB
ANION GAP SERPL CALC-SCNC: 13 MMOL/L — SIGNIFICANT CHANGE UP (ref 7–14)
BASOPHILS # BLD AUTO: 0.05 K/UL — SIGNIFICANT CHANGE UP (ref 0–0.2)
BASOPHILS NFR BLD AUTO: 0.6 % — SIGNIFICANT CHANGE UP (ref 0–2)
BUN SERPL-MCNC: 8 MG/DL — SIGNIFICANT CHANGE UP (ref 7–23)
CALCIUM SERPL-MCNC: 9.4 MG/DL — SIGNIFICANT CHANGE UP (ref 8.4–10.5)
CHLORIDE SERPL-SCNC: 103 MMOL/L — SIGNIFICANT CHANGE UP (ref 98–107)
CO2 SERPL-SCNC: 21 MMOL/L — LOW (ref 22–31)
CREAT SERPL-MCNC: 0.89 MG/DL — SIGNIFICANT CHANGE UP (ref 0.5–1.3)
EGFR: 63 ML/MIN/1.73M2 — SIGNIFICANT CHANGE UP
EOSINOPHIL # BLD AUTO: 0.46 K/UL — SIGNIFICANT CHANGE UP (ref 0–0.5)
EOSINOPHIL NFR BLD AUTO: 6 % — SIGNIFICANT CHANGE UP (ref 0–6)
GLUCOSE SERPL-MCNC: 115 MG/DL — HIGH (ref 70–99)
HCT VFR BLD CALC: 39.5 % — SIGNIFICANT CHANGE UP (ref 34.5–45)
HGB BLD-MCNC: 12.4 G/DL — SIGNIFICANT CHANGE UP (ref 11.5–15.5)
IANC: 4.18 K/UL — SIGNIFICANT CHANGE UP (ref 1.8–7.4)
IMM GRANULOCYTES NFR BLD AUTO: 0.3 % — SIGNIFICANT CHANGE UP (ref 0–0.9)
LYMPHOCYTES # BLD AUTO: 2.34 K/UL — SIGNIFICANT CHANGE UP (ref 1–3.3)
LYMPHOCYTES # BLD AUTO: 30.3 % — SIGNIFICANT CHANGE UP (ref 13–44)
MAGNESIUM SERPL-MCNC: 2.3 MG/DL — SIGNIFICANT CHANGE UP (ref 1.6–2.6)
MCHC RBC-ENTMCNC: 28.3 PG — SIGNIFICANT CHANGE UP (ref 27–34)
MCHC RBC-ENTMCNC: 31.4 GM/DL — LOW (ref 32–36)
MCV RBC AUTO: 90.2 FL — SIGNIFICANT CHANGE UP (ref 80–100)
MONOCYTES # BLD AUTO: 0.68 K/UL — SIGNIFICANT CHANGE UP (ref 0–0.9)
MONOCYTES NFR BLD AUTO: 8.8 % — SIGNIFICANT CHANGE UP (ref 2–14)
NEUTROPHILS # BLD AUTO: 4.18 K/UL — SIGNIFICANT CHANGE UP (ref 1.8–7.4)
NEUTROPHILS NFR BLD AUTO: 54 % — SIGNIFICANT CHANGE UP (ref 43–77)
NRBC # BLD: 0 /100 WBCS — SIGNIFICANT CHANGE UP (ref 0–0)
NRBC # FLD: 0 K/UL — SIGNIFICANT CHANGE UP (ref 0–0)
PHOSPHATE SERPL-MCNC: 4.5 MG/DL — SIGNIFICANT CHANGE UP (ref 2.5–4.5)
PLATELET # BLD AUTO: 385 K/UL — SIGNIFICANT CHANGE UP (ref 150–400)
POTASSIUM SERPL-MCNC: 4.4 MMOL/L — SIGNIFICANT CHANGE UP (ref 3.5–5.3)
POTASSIUM SERPL-SCNC: 4.4 MMOL/L — SIGNIFICANT CHANGE UP (ref 3.5–5.3)
RBC # BLD: 4.38 M/UL — SIGNIFICANT CHANGE UP (ref 3.8–5.2)
RBC # FLD: 13.9 % — SIGNIFICANT CHANGE UP (ref 10.3–14.5)
SODIUM SERPL-SCNC: 137 MMOL/L — SIGNIFICANT CHANGE UP (ref 135–145)
WBC # BLD: 7.73 K/UL — SIGNIFICANT CHANGE UP (ref 3.8–10.5)
WBC # FLD AUTO: 7.73 K/UL — SIGNIFICANT CHANGE UP (ref 3.8–10.5)

## 2023-07-27 PROCEDURE — 99232 SBSQ HOSP IP/OBS MODERATE 35: CPT

## 2023-07-27 RX ORDER — POLYETHYLENE GLYCOL 3350 17 G/17G
17 POWDER, FOR SOLUTION ORAL
Refills: 0 | Status: DISCONTINUED | OUTPATIENT
Start: 2023-07-27 | End: 2023-07-28

## 2023-07-27 RX ORDER — LIDOCAINE 4 G/100G
1 CREAM TOPICAL
Refills: 0 | Status: DISCONTINUED | OUTPATIENT
Start: 2023-07-27 | End: 2023-07-28

## 2023-07-27 RX ADMIN — DORZOLAMIDE HYDROCHLORIDE TIMOLOL MALEATE 1 DROP(S): 20; 5 SOLUTION/ DROPS OPHTHALMIC at 05:47

## 2023-07-27 RX ADMIN — POLYETHYLENE GLYCOL 3350 17 GRAM(S): 17 POWDER, FOR SOLUTION ORAL at 00:16

## 2023-07-27 RX ADMIN — Medication 1 PACKET(S): at 12:49

## 2023-07-27 RX ADMIN — AMLODIPINE BESYLATE 5 MILLIGRAM(S): 2.5 TABLET ORAL at 05:45

## 2023-07-27 RX ADMIN — DORZOLAMIDE HYDROCHLORIDE TIMOLOL MALEATE 1 DROP(S): 20; 5 SOLUTION/ DROPS OPHTHALMIC at 18:12

## 2023-07-27 RX ADMIN — LISINOPRIL 20 MILLIGRAM(S): 2.5 TABLET ORAL at 05:45

## 2023-07-27 RX ADMIN — GABAPENTIN 100 MILLIGRAM(S): 400 CAPSULE ORAL at 21:33

## 2023-07-27 RX ADMIN — Medication 100 MILLIGRAM(S): at 05:45

## 2023-07-27 RX ADMIN — Medication 400 MILLIGRAM(S): at 00:16

## 2023-07-27 RX ADMIN — Medication 1000 MILLIGRAM(S): at 00:46

## 2023-07-27 RX ADMIN — ATORVASTATIN CALCIUM 20 MILLIGRAM(S): 80 TABLET, FILM COATED ORAL at 21:34

## 2023-07-27 RX ADMIN — POLYETHYLENE GLYCOL 3350 17 GRAM(S): 17 POWDER, FOR SOLUTION ORAL at 18:12

## 2023-07-27 NOTE — PROGRESS NOTE ADULT - TIME BILLING
Review of laboratory data, radiology results, consultants' recommendations, documentation in [place], discussion with patient/house staff and interdisciplinary staff (such as , social workers, etc). Interventions were performed as documented above.

## 2023-07-28 ENCOUNTER — TRANSCRIPTION ENCOUNTER (OUTPATIENT)
Age: 83
End: 2023-07-28

## 2023-07-28 VITALS
TEMPERATURE: 98 F | RESPIRATION RATE: 18 BRPM | SYSTOLIC BLOOD PRESSURE: 134 MMHG | HEART RATE: 77 BPM | OXYGEN SATURATION: 98 % | DIASTOLIC BLOOD PRESSURE: 69 MMHG

## 2023-07-28 DIAGNOSIS — K62.3 RECTAL PROLAPSE: ICD-10-CM

## 2023-07-28 PROBLEM — Z00.00 ENCOUNTER FOR PREVENTIVE HEALTH EXAMINATION: Status: ACTIVE | Noted: 2023-07-28

## 2023-07-28 LAB
ANION GAP SERPL CALC-SCNC: 12 MMOL/L — SIGNIFICANT CHANGE UP (ref 7–14)
BUN SERPL-MCNC: 17 MG/DL — SIGNIFICANT CHANGE UP (ref 7–23)
CALCIUM SERPL-MCNC: 9.1 MG/DL — SIGNIFICANT CHANGE UP (ref 8.4–10.5)
CHLORIDE SERPL-SCNC: 100 MMOL/L — SIGNIFICANT CHANGE UP (ref 98–107)
CO2 SERPL-SCNC: 23 MMOL/L — SIGNIFICANT CHANGE UP (ref 22–31)
CREAT SERPL-MCNC: 0.97 MG/DL — SIGNIFICANT CHANGE UP (ref 0.5–1.3)
EGFR: 57 ML/MIN/1.73M2 — LOW
GLUCOSE SERPL-MCNC: 126 MG/DL — HIGH (ref 70–99)
HCT VFR BLD CALC: 38.5 % — SIGNIFICANT CHANGE UP (ref 34.5–45)
HGB BLD-MCNC: 12.2 G/DL — SIGNIFICANT CHANGE UP (ref 11.5–15.5)
MAGNESIUM SERPL-MCNC: 2.1 MG/DL — SIGNIFICANT CHANGE UP (ref 1.6–2.6)
MCHC RBC-ENTMCNC: 28.2 PG — SIGNIFICANT CHANGE UP (ref 27–34)
MCHC RBC-ENTMCNC: 31.7 GM/DL — LOW (ref 32–36)
MCV RBC AUTO: 89.1 FL — SIGNIFICANT CHANGE UP (ref 80–100)
NRBC # BLD: 0 /100 WBCS — SIGNIFICANT CHANGE UP (ref 0–0)
NRBC # FLD: 0 K/UL — SIGNIFICANT CHANGE UP (ref 0–0)
PHOSPHATE SERPL-MCNC: 4.3 MG/DL — SIGNIFICANT CHANGE UP (ref 2.5–4.5)
PLATELET # BLD AUTO: 384 K/UL — SIGNIFICANT CHANGE UP (ref 150–400)
POTASSIUM SERPL-MCNC: 3.9 MMOL/L — SIGNIFICANT CHANGE UP (ref 3.5–5.3)
POTASSIUM SERPL-SCNC: 3.9 MMOL/L — SIGNIFICANT CHANGE UP (ref 3.5–5.3)
RBC # BLD: 4.32 M/UL — SIGNIFICANT CHANGE UP (ref 3.8–5.2)
RBC # FLD: 13.5 % — SIGNIFICANT CHANGE UP (ref 10.3–14.5)
SODIUM SERPL-SCNC: 135 MMOL/L — SIGNIFICANT CHANGE UP (ref 135–145)
WBC # BLD: 6.49 K/UL — SIGNIFICANT CHANGE UP (ref 3.8–10.5)
WBC # FLD AUTO: 6.49 K/UL — SIGNIFICANT CHANGE UP (ref 3.8–10.5)

## 2023-07-28 PROCEDURE — 99239 HOSP IP/OBS DSCHRG MGMT >30: CPT

## 2023-07-28 RX ORDER — PSYLLIUM SEED (WITH DEXTROSE)
1 POWDER (GRAM) ORAL
Qty: 30 | Refills: 0
Start: 2023-07-28 | End: 2023-08-26

## 2023-07-28 RX ORDER — ATORVASTATIN CALCIUM 80 MG/1
1 TABLET, FILM COATED ORAL
Qty: 0 | Refills: 0 | DISCHARGE
Start: 2023-07-28

## 2023-07-28 RX ORDER — AMLODIPINE BESYLATE 2.5 MG/1
1 TABLET ORAL
Qty: 0 | Refills: 0 | DISCHARGE
Start: 2023-07-28

## 2023-07-28 RX ORDER — ATORVASTATIN CALCIUM 80 MG/1
0 TABLET, FILM COATED ORAL
Qty: 0 | Refills: 0 | DISCHARGE

## 2023-07-28 RX ORDER — METOPROLOL TARTRATE 50 MG
0 TABLET ORAL
Qty: 0 | Refills: 0 | DISCHARGE

## 2023-07-28 RX ORDER — METOPROLOL TARTRATE 50 MG
1 TABLET ORAL
Qty: 0 | Refills: 0 | DISCHARGE
Start: 2023-07-28

## 2023-07-28 RX ORDER — POLYETHYLENE GLYCOL 3350 17 G/17G
17 POWDER, FOR SOLUTION ORAL
Qty: 1020 | Refills: 0
Start: 2023-07-28 | End: 2023-08-26

## 2023-07-28 RX ORDER — AMLODIPINE BESYLATE 2.5 MG/1
0 TABLET ORAL
Qty: 0 | Refills: 0 | DISCHARGE

## 2023-07-28 RX ADMIN — Medication 100 MILLIGRAM(S): at 05:16

## 2023-07-28 RX ADMIN — POLYETHYLENE GLYCOL 3350 17 GRAM(S): 17 POWDER, FOR SOLUTION ORAL at 05:16

## 2023-07-28 RX ADMIN — Medication 1 PACKET(S): at 11:00

## 2023-07-28 RX ADMIN — LISINOPRIL 20 MILLIGRAM(S): 2.5 TABLET ORAL at 05:16

## 2023-07-28 RX ADMIN — DORZOLAMIDE HYDROCHLORIDE TIMOLOL MALEATE 1 DROP(S): 20; 5 SOLUTION/ DROPS OPHTHALMIC at 05:19

## 2023-07-28 RX ADMIN — AMLODIPINE BESYLATE 5 MILLIGRAM(S): 2.5 TABLET ORAL at 05:16

## 2023-07-28 NOTE — PROGRESS NOTE ADULT - PROBLEM SELECTOR PLAN 1
- Reducible. no evidence of bowel obstruction on imaging  - discussed with surgery, hold off on consult for now, can f/u outpatient for definitive management  - No further inpatient GI workup, appreciate GI recs  - in reviewing CT scan, no significant stool burden noted, no obstruction noted  - continue miralax, fiber  - Continue regular diet  - rectal lidocaine for pain
- Reducible. no evidence of bowel obstruction on imaging  - discussed with surgery, hold off on consult for now, can f/u outpatient for definitive management  - No further inpatient GI workup, appreciate GI recs  - in reviewing CT scan, no significant stool burden noted, no obstruction noted  - continue miralax, fiber  - Continue regular diet  - rectal lidocaine for pain

## 2023-07-28 NOTE — DISCHARGE NOTE PROVIDER - NSDCFUADDAPPT_GEN_ALL_CORE_FT
Follow up with your primary care physician for further monitoring in 1-2 weeks. Please call to arrange appointment.  Follow up with gastroenterology within 1-2 weeks of discharge.   APPTS ARE READY TO BE MADE: [x] YES    Best Family or Patient Contact (if needed): Sunny Shannon [phone]  Additional Information about above appointments (if needed):  1: Urology Follow up within 1 week for barajas management  2: GI follow up 1-2 weeks for rectal prolapse management  3: PMD follow up 1-2 weeks     APPTS ARE READY TO BE MADE: [x] YES    Best Family or Patient Contact (if needed): Sunny Shannon [phone]  Additional Information about above appointments (if needed):  1: Urology Follow up within 1 week for barajas management  2: GI follow up 1-2 weeks for rectal prolapse management  3: PMD follow up 1-2 weeks    Patient was previously scheduled to see Dr. Marcus at 10:30AM on 8/24 at [address] [place] [address]. A message was sent to their provider for a sooner appointment. APPTS ARE READY TO BE MADE: [x] YES    Best Family or Patient Contact (if needed): Sunny Shannon [phone]  Additional Information about above appointments (if needed):  1: Urology Follow up within 1 week for barajas management  2: GI follow up 1-2 weeks for rectal prolapse management  3: PMD follow up 1-2 weeks    Patient was previously scheduled to see Dr. Marcus at 10:30AM on 8/24 at [address] [place] [address]. A message was sent to their provider for a sooner appointment.    [facility] was contacted to secure an appointment, however the office will follow up with the patient/caregiver directly.

## 2023-07-28 NOTE — DISCHARGE NOTE PROVIDER - NSDCHHCONTACT_GEN_ALL_CORE_FT
Patient completed HCPOA.  and RN witnessed signature. Copies made: Original and 2 copies to the patient, 1 copy placed on paper chart on unit. Patient chose to not complete LW.  (Side note: Patient's son also completed HCPOA for himself, witnessed and copies given.) As certified below, I, or a nurse practitioner or physician assistant working with me, had a face-to-face encounter that meets the physician face-to-face encounter requirements.

## 2023-07-28 NOTE — PROGRESS NOTE ADULT - PROBLEM SELECTOR PLAN 2
- barajas in place, draining appropriately with good urine output  - family to reschedule urology appointment  - renal function is stable
- barajas in place, draining appropriately with good urine output  - has urology appt on Friday  - renal function is stable

## 2023-07-28 NOTE — DISCHARGE NOTE NURSING/CASE MANAGEMENT/SOCIAL WORK - NSDCFUADDAPPT_GEN_ALL_CORE_FT
APPTS ARE READY TO BE MADE: [x] YES    Best Family or Patient Contact (if needed): Sunny Shannon [phone]  Additional Information about above appointments (if needed):  1: Urology Follow up within 1 week for barajas management  2: GI follow up 1-2 weeks for rectal prolapse management  3: PMD follow up 1-2 weeks

## 2023-07-28 NOTE — DISCHARGE NOTE PROVIDER - NSFOLLOWUPCLINICSTOKEN_GEN_ALL_ED_FT
161882:1 week|| ||00\01||False;945294:2 weeks|| ||00\01||False; 458407:2 weeks|| ||00\01||False;334321:1 week|| ||00\01||False;125266: || ||00\01||False;

## 2023-07-28 NOTE — PHYSICAL THERAPY INITIAL EVALUATION ADULT - NSPTDISCHREC_GEN_A_CORE
Home PT to address lower extremity weakness.  Pt would benefit from a RW to address ambulation limitations.

## 2023-07-28 NOTE — PROGRESS NOTE ADULT - PROBLEM SELECTOR PLAN 4
-on Farxiga and metformin  -insulin sliding scale for now
-on Farxiga and metformin  -insulin sliding scale for now

## 2023-07-28 NOTE — PHYSICAL THERAPY INITIAL EVALUATION ADULT - ADDITIONAL COMMENTS
Pt lives in a home with her , has fallen in the past, has a staircase in home and completes adls without assistance.

## 2023-07-28 NOTE — DISCHARGE NOTE PROVIDER - CARE PROVIDER_API CALL
Raghu Marcus  Urology  [address] [place] [id number]  Phone: [phone]  Fax: [phone]  Follow Up Time:

## 2023-07-28 NOTE — PROGRESS NOTE ADULT - ASSESSMENT
82 y/o F with hx DM2, HTN and HLD who presents with subacute abdominal pain x 4-6 weeks with worsening over the past 2 weeks found to have b/l hydronephrosis, as well as bladder, uterine and rectal prolapse 2/2 pelvic floor dysfunction
 84 y/o F with hx DM2, HTN and HLD who presents with subacute abdominal pain x 4-6 weeks with worsening over the past 2 weeks found to have b/l hydronephrosis, as well as bladder, uterine and rectal prolapse 2/2 pelvic floor dysfunction

## 2023-07-28 NOTE — DISCHARGE NOTE PROVIDER - HOSPITAL COURSE
84 y/o F with hx DM2, HTN and HLD who presents with subacute abdominal pain x 4-6 weeks with worsening over the past 2 weeks found to have b/l hydronephrosis, as well as bladder, uterine and rectal prolapse 2/2 pelvic floor dysfunction. Pt was seen by GI and no role for inpatient management. Pt has chronic barajas placed at outside hospital and is pending outpatient urology evaluation. Plan d/w Son at bedside. All questions answered.      To Do:  [ ] continue stool softeners  [ ] urology follow up  [ ] GI follow up  [ ] general surgery follow up     84 y/o F with hx DM2, HTN and HLD who presents with subacute abdominal pain x 4-6 weeks with worsening over the past 2 weeks found to have b/l hydronephrosis, as well as bladder, uterine and rectal prolapse 2/2 pelvic floor dysfunction. Pt was seen by GI and no role for inpatient management. Pt has chronic barajas placed at outside hospital and is pending outpatient urology evaluation. Plan d/w Son at bedside. All questions answered.      To Do:  [ ] continue stool softeners  [ ] urology follow up  [ ] GI follow up  [ ] general surgery follow up    Please evaluate pt for increased home care hours.

## 2023-07-28 NOTE — DISCHARGE NOTE PROVIDER - NSDCCPCAREPLAN_GEN_ALL_CORE_FT
PRINCIPAL DISCHARGE DIAGNOSIS  Diagnosis: Abdominal pain  Assessment and Plan of Treatment: Follow up with a urologist within 1 week for management of barajas catheter care. Follow up with GI within 1-2 weeks for management of rectal prolapse. Continue to use miralax 17g two times per day, metamucil and lactulose for management of constipation. Return to the ED for any concerning symptoms including fever, changes in mental status, pain, or any other concerning symptom.

## 2023-07-28 NOTE — DISCHARGE NOTE PROVIDER - NSDCMRMEDTOKEN_GEN_ALL_CORE_FT
amLODIPine 5 mg oral tablet: 1 tab(s) orally once a day  atorvastatin 20 mg oral tablet: 1 tab(s) orally once a day (at bedtime)  DORZOLAMIDE HCL/TIMOLOL MALEATE  22.3-6.8 MG/ML SOLN:   EZETIMIBE  10 MG TABS:   FARXIGA  10 MG TABS:   GABAPENTIN  100 MG CAPS:   LISINOPRIL  20 MG TABS:   METFORMIN HYDROCHLORIDE ER  500 MG TB24:   metoprolol succinate 100 mg oral tablet, extended release: 1 tab(s) orally once a day   aluminum hydroxide-magnesium hydroxide 200 mg-200 mg/5 mL oral suspension: 30 milliliter(s) orally every 4 hours as needed for Dyspepsia 30 ml  amLODIPine 5 mg oral tablet: 1 tab(s) orally once a day  atorvastatin 20 mg oral tablet: 1 tab(s) orally once a day (at bedtime)  DORZOLAMIDE HCL/TIMOLOL MALEATE  22.3-6.8 MG/ML SOLN:   EZETIMIBE  10 MG TABS:   FARXIGA  10 MG TABS:   GABAPENTIN  100 MG CAPS:   LISINOPRIL  20 MG TABS:   METFORMIN HYDROCHLORIDE ER  500 MG TB24:   metoprolol succinate 100 mg oral tablet, extended release: 1 tab(s) orally once a day  polyethylene glycol 3350 oral powder for reconstitution: 17 gram(s) orally 2 times a day  psyllium 3.4 g/7 g oral powder for reconstitution: 1 packet(s) orally once a day   alcohol pads: Use three times a day  aluminum hydroxide-magnesium hydroxide 200 mg-200 mg/5 mL oral suspension: 30 milliliter(s) orally every 4 hours as needed for Dyspepsia 30 ml  amLODIPine 5 mg oral tablet: 1 tab(s) orally once a day  atorvastatin 20 mg oral tablet: 1 tab(s) orally once a day (at bedtime)  DORZOLAMIDE HCL/TIMOLOL MALEATE  22.3-6.8 MG/ML SOLN:   EZETIMIBE  10 MG TABS:   FARXIGA  10 MG TABS:   GABAPENTIN  100 MG CAPS:   glucometer: use tid  lancets: use tid  LISINOPRIL  20 MG TABS:   METFORMIN HYDROCHLORIDE ER  500 MG TB24:   metoprolol succinate 100 mg oral tablet, extended release: 1 tab(s) orally once a day  polyethylene glycol 3350 oral powder for reconstitution: 17 gram(s) orally 2 times a day  psyllium 3.4 g/7 g oral powder for reconstitution: 1 packet(s) orally once a day  test strips: use tid  test strips: use three times a day

## 2023-07-28 NOTE — PROGRESS NOTE ADULT - SUBJECTIVE AND OBJECTIVE BOX
[facility] Division of Hospital Medicine  Dafne Florez MD  Available via MS Teams    SUBJECTIVE / OVERNIGHT EVENTS: Pt with rectal pain overnight which persisted into this morning. Pt had one watery BM yesterday without blood. Reports mild suprapubic pain.    ADDITIONAL REVIEW OF SYSTEMS: Denies nausea, vomiting, fevers, chills, chest pain, shortness of breath    MEDICATIONS  (STANDING):  amLODIPine   Tablet 5 milliGRAM(s) Oral daily  atorvastatin 20 milliGRAM(s) Oral at bedtime  dorzolamide 2%/timolol 0.5% Ophthalmic Solution 1 Drop(s) Both EYES two times a day  gabapentin 100 milliGRAM(s) Oral at bedtime  lidocaine 2% Gel 1 Application(s) Topical two times a day  lisinopril 20 milliGRAM(s) Oral daily  metoprolol succinate  milliGRAM(s) Oral daily  polyethylene glycol 3350 17 Gram(s) Oral two times a day  psyllium Powder 1 Packet(s) Oral daily    MEDICATIONS  (PRN):  acetaminophen     Tablet .. 650 milliGRAM(s) Oral every 6 hours PRN Temp greater or equal to 38C (100.4F), Mild Pain (1 - 3)  aluminum hydroxide/magnesium hydroxide/simethicone Suspension 30 milliLiter(s) Oral every 4 hours PRN Dyspepsia  melatonin 3 milliGRAM(s) Oral at bedtime PRN Insomnia  ondansetron Injectable 4 milliGRAM(s) IV Push every 8 hours PRN Nausea and/or Vomiting      I&O's Summary      PHYSICAL EXAM:  Vital Signs Last 24 Hrs  T(C): 36.4 (27 Jul 2023 05:45), Max: 36.8 (26 Jul 2023 18:10)  T(F): 97.5 (27 Jul 2023 05:45), Max: 98.2 (26 Jul 2023 18:10)  HR: 89 (27 Jul 2023 05:45) (82 - 93)  BP: 152/87 (27 Jul 2023 05:45) (141/72 - 156/84)  BP(mean): --  RR: 17 (27 Jul 2023 05:45) (17 - 18)  SpO2: 97% (27 Jul 2023 05:45) (97% - 98%)    Parameters below as of 27 Jul 2023 05:45  Patient On (Oxygen Delivery Method): room air      CONSTITUTIONAL: NAD, well-developed, well-groomed  EYES: PERRLA; conjunctiva and sclera clear  ENMT: Moist oral mucosa, no pharyngeal injection or exudates; normal dentition  NECK: Supple, no palpable masses; no thyromegaly  RESPIRATORY: Normal respiratory effort; lungs are clear to auscultation bilaterally  CARDIOVASCULAR: Regular rate and rhythm, normal S1 and S2, no murmur/rub/gallop; No lower extremity edema; Peripheral pulses are 2+ bilaterally  ABDOMEN: Nontender to palpation, normoactive bowel sounds, no rebound/guarding; No hepatosplenomegaly; rectum without hemorrhoid or prolapse  MUSCULOSKELETAL:  Normal gait; no clubbing or cyanosis of digits; no joint swelling or tenderness to palpation  PSYCH: A+O to person, place, and time; affect appropriate  NEUROLOGY: CN 2-12 are intact and symmetric; no gross sensory deficits   SKIN: No rashes; no palpable lesions    LABS:                        12.4   7.73  )-----------( 385      ( 27 Jul 2023 05:34 )             39.5     Hb Trend: 12.4<--, 11.1<--  07-27    137  |  103  |  8   ----------------------------<  115<H>  4.4   |  21<L>  |  0.89    Ca    9.4      27 Jul 2023 05:34  Phos  4.5     07-27  Mg     2.30     07-27    TPro  6.7  /  Alb  3.9  /  TBili  <0.2  /  DBili  x   /  AST  31  /  ALT  28  /  AlkPhos  123<H>  07-25    Creatinine Trend: 0.89<--, 0.88<--        Urinalysis Basic - ( 27 Jul 2023 05:34 )    Color: x / Appearance: x / SG: x / pH: x  Gluc: 115 mg/dL / Ketone: x  / Bili: x / Urobili: x   Blood: x / Protein: x / Nitrite: x   Leuk Esterase: x / RBC: x / WBC x   Sq Epi: x / Non Sq Epi: x / Bacteria: x            RADIOLOGY & ADDITIONAL TESTS:  N/A    COORDINATION OF CARE:  Consultant Communication and Details of Discussion (where applicable): D/w GI- no inpatient procedures indicated at this time    
[facility] Division of Hospital Medicine  Dafne Florez MD  Available via MS Teams    SUBJECTIVE / OVERNIGHT EVENTS: No acute overnight events. Rectal pain improved. Pt pending PT eval.    ADDITIONAL REVIEW OF SYSTEMS: All other ROS negative    MEDICATIONS  (STANDING):  amLODIPine   Tablet 5 milliGRAM(s) Oral daily  atorvastatin 20 milliGRAM(s) Oral at bedtime  dorzolamide 2%/timolol 0.5% Ophthalmic Solution 1 Drop(s) Both EYES two times a day  gabapentin 100 milliGRAM(s) Oral at bedtime  lidocaine 2% Gel 1 Application(s) Topical two times a day  lisinopril 20 milliGRAM(s) Oral daily  metoprolol succinate  milliGRAM(s) Oral daily  polyethylene glycol 3350 17 Gram(s) Oral two times a day  psyllium Powder 1 Packet(s) Oral daily    MEDICATIONS  (PRN):  acetaminophen     Tablet .. 650 milliGRAM(s) Oral every 6 hours PRN Temp greater or equal to 38C (100.4F), Mild Pain (1 - 3)  aluminum hydroxide/magnesium hydroxide/simethicone Suspension 30 milliLiter(s) Oral every 4 hours PRN Dyspepsia  melatonin 3 milliGRAM(s) Oral at bedtime PRN Insomnia  ondansetron Injectable 4 milliGRAM(s) IV Push every 8 hours PRN Nausea and/or Vomiting      I&O's Summary      PHYSICAL EXAM:  Vital Signs Last 24 Hrs  T(C): 36.4 (28 Jul 2023 12:17), Max: 36.9 (27 Jul 2023 20:30)  T(F): 97.5 (28 Jul 2023 12:17), Max: 98.4 (27 Jul 2023 20:30)  HR: 77 (28 Jul 2023 12:17) (64 - 87)  BP: 134/69 (28 Jul 2023 12:17) (107/53 - 163/80)  BP(mean): --  RR: 18 (28 Jul 2023 12:17) (17 - 18)  SpO2: 98% (28 Jul 2023 12:17) (94% - 98%)    Parameters below as of 28 Jul 2023 12:17  Patient On (Oxygen Delivery Method): room air      CONSTITUTIONAL: NAD, well-developed, well-groomed  EYES: PERRLA; conjunctiva and sclera clear  ENMT: Moist oral mucosa, no pharyngeal injection or exudates; normal dentition  NECK: Supple, no palpable masses; no thyromegaly  RESPIRATORY: Normal respiratory effort; lungs are clear to auscultation bilaterally  CARDIOVASCULAR: Regular rate and rhythm, normal S1 and S2, no murmur/rub/gallop; No lower extremity edema; Peripheral pulses are 2+ bilaterally  ABDOMEN: Nontender to palpation, normoactive bowel sounds, no rebound/guarding; No hepatosplenomegaly  MUSCULOSKELETAL:  Normal gait; no clubbing or cyanosis of digits; no joint swelling or tenderness to palpation  PSYCH: A+O to person, place, and time; affect appropriate  NEUROLOGY: CN 2-12 are intact and symmetric; no gross sensory deficits   SKIN: No rashes; no palpable lesions  +barajas in place    LABS:                        12.2   6.49  )-----------( 384      ( 28 Jul 2023 05:36 )             38.5     Hb Trend: 12.2<--, 12.4<--, 11.1<--  07-28    135  |  100  |  17  ----------------------------<  126<H>  3.9   |  23  |  0.97    Ca    9.1      28 Jul 2023 05:36  Phos  4.3     07-28  Mg     2.10     07-28      Creatinine Trend: 0.97<--, 0.89<--, 0.88<--        Urinalysis Basic - ( 28 Jul 2023 05:36 )    Color: x / Appearance: x / SG: x / pH: x  Gluc: 126 mg/dL / Ketone: x  / Bili: x / Urobili: x   Blood: x / Protein: x / Nitrite: x   Leuk Esterase: x / RBC: x / WBC x   Sq Epi: x / Non Sq Epi: x / Bacteria: x            RADIOLOGY & ADDITIONAL TESTS:  N/A  COORDINATION OF CARE:  Consultant Communication and Details of Discussion (where applicable): N/A

## 2023-07-28 NOTE — DISCHARGE NOTE PROVIDER - NSFOLLOWUPCLINICS_GEN_ALL_ED_FT
[facility] [place]  Urology  [address] [place] [address]  Phone: [phone]  Fax:   Follow Up Time: 1 week    [facility] [place] [facility]  Gastroenterology  [facility] [place] [address]  Phone: [phone]  Fax:   Follow Up Time: 2 weeks     [facility] [place]  Gastroenterology  [address] [place] [address]  Phone: [phone]  Fax:   Follow Up Time: 2 weeks    [facility] [place]  Urology  [address] [place] [address]  Phone: [phone]  Fax:   Follow Up Time: 1 week    [facility]  General Surgery  [address] [place] [address] [address]rd [place] [address]  Phone: [phone]  Fax:

## 2023-07-28 NOTE — PHYSICAL THERAPY INITIAL EVALUATION ADULT - PERTINENT HX OF CURRENT PROBLEM, REHAB EVAL
HPI: 84 y/o F with hx DM2, HTN and HLD who presents with subacute abdominal pain x 4-6 weeks with worsening over the past 2 weeks. Per family Pt has been uncomfortable for some time. Has presented to care multiple times, told it was constipation, discharged on laxatives with no relief. Most recently Pt went to [facility], was assessed there (unclear if imaging was done), but was told about bladder prolapse, had a barajas inserted, given laxatives and told to f/u outpatient with urology. Pt reports feeling slightly improved after barajas but not significantly, continuing to feel urgency to use the restroom but not having a bowel movement. Per family, has not had solid stools in a month, just watery stools or small bill. +passing gas. +mild weight loss, does not know how much but has not been eating. No n/v.

## 2023-07-28 NOTE — DISCHARGE NOTE PROVIDER - NSDCFUSCHEDAPPT_GEN_ALL_CORE_FT
Raghu Marcus  [facility] Physician [facility]  UROLOGY [address] [facility]  Scheduled Appointment: 08/24/2023

## 2023-07-28 NOTE — DISCHARGE NOTE NURSING/CASE MANAGEMENT/SOCIAL WORK - PATIENT PORTAL LINK FT
You can access the FollowMyHealth Patient Portal offered by [facility] by registering at the following website: [email or web address]. By joining [employer]’s FollowMyHealth portal, you will also be able to view your health information using other applications (apps) compatible with our system.

## 2023-07-28 NOTE — PROGRESS NOTE ADULT - PROBLEM SELECTOR PLAN 6
DVT: encourage ambulation, SCDs  Full Code  Dispo: d/c home pending PT eval    Son, Sunny, updated at bedside. Requesting pt to stay longer to help gain her strength back. Explained that if PT recommends home staying in the hospital will only prolong weakness. Will follow up after PT vickey
DVT: encourage ambulation, SCDs  Full Code  Dispo: d/c home tomorrow

## 2023-08-24 ENCOUNTER — APPOINTMENT (OUTPATIENT)
Dept: UROLOGY | Facility: CLINIC | Age: 83
End: 2023-08-24

## 2024-01-23 ENCOUNTER — APPOINTMENT (OUTPATIENT)
Dept: OPHTHALMOLOGY | Facility: CLINIC | Age: 84
End: 2024-01-23

## 2024-06-13 ENCOUNTER — APPOINTMENT (OUTPATIENT)
Dept: OPHTHALMOLOGY | Facility: CLINIC | Age: 84
End: 2024-06-13

## 2025-03-10 NOTE — PATIENT PROFILE ADULT - BILL PAYMENT
Several unsuccessful attempts have been made to contact patient for Pre-admission health history.  Have also tried to reach patient contact numbers as well and unsuccessful.  No  voicemail option to leave a message on any of the numbers listed.  Called office earlier today to verify patient number.  Was given another phone number, however still unable to contact patient.  Staff message sent to Dr. Campbell regarding no interview.  
no